# Patient Record
Sex: MALE | Race: BLACK OR AFRICAN AMERICAN | Employment: UNEMPLOYED | URBAN - METROPOLITAN AREA
[De-identification: names, ages, dates, MRNs, and addresses within clinical notes are randomized per-mention and may not be internally consistent; named-entity substitution may affect disease eponyms.]

---

## 2022-02-07 ENCOUNTER — OFFICE VISIT (OUTPATIENT)
Dept: FAMILY MEDICINE CLINIC | Age: 57
End: 2022-02-07
Payer: COMMERCIAL

## 2022-02-07 ENCOUNTER — TELEPHONE (OUTPATIENT)
Dept: FAMILY MEDICINE CLINIC | Age: 57
End: 2022-02-07

## 2022-02-07 VITALS
HEART RATE: 54 BPM | BODY MASS INDEX: 29.89 KG/M2 | TEMPERATURE: 98.5 F | DIASTOLIC BLOOD PRESSURE: 75 MMHG | SYSTOLIC BLOOD PRESSURE: 163 MMHG | HEIGHT: 70 IN | OXYGEN SATURATION: 98 % | WEIGHT: 208.8 LBS | RESPIRATION RATE: 18 BRPM

## 2022-02-07 DIAGNOSIS — Z12.11 COLON CANCER SCREENING: ICD-10-CM

## 2022-02-07 DIAGNOSIS — Z13.29 SCREENING FOR THYROID DISORDER: ICD-10-CM

## 2022-02-07 DIAGNOSIS — Z11.59 ENCOUNTER FOR HEPATITIS C SCREENING TEST FOR LOW RISK PATIENT: ICD-10-CM

## 2022-02-07 DIAGNOSIS — E78.5 HYPERLIPIDEMIA, UNSPECIFIED HYPERLIPIDEMIA TYPE: ICD-10-CM

## 2022-02-07 DIAGNOSIS — M1A.9XX0 CHRONIC GOUT INVOLVING TOE OF LEFT FOOT WITHOUT TOPHUS, UNSPECIFIED CAUSE: ICD-10-CM

## 2022-02-07 DIAGNOSIS — R07.9 INTERMITTENT CHEST PAIN: ICD-10-CM

## 2022-02-07 DIAGNOSIS — Z00.00 ENCOUNTER FOR MEDICAL EXAMINATION TO ESTABLISH CARE: Primary | ICD-10-CM

## 2022-02-07 DIAGNOSIS — Z12.5 SPECIAL SCREENING EXAMINATION FOR NEOPLASM OF PROSTATE: ICD-10-CM

## 2022-02-07 DIAGNOSIS — K21.9 GASTROESOPHAGEAL REFLUX DISEASE WITHOUT ESOPHAGITIS: ICD-10-CM

## 2022-02-07 DIAGNOSIS — G44.89 OTHER HEADACHE SYNDROME: ICD-10-CM

## 2022-02-07 DIAGNOSIS — E55.9 VITAMIN D DEFICIENCY: ICD-10-CM

## 2022-02-07 DIAGNOSIS — I10 PRIMARY HYPERTENSION: ICD-10-CM

## 2022-02-07 DIAGNOSIS — R03.0 ELEVATED BLOOD PRESSURE READING: ICD-10-CM

## 2022-02-07 PROCEDURE — 99204 OFFICE O/P NEW MOD 45 MIN: CPT | Performed by: NURSE PRACTITIONER

## 2022-02-07 PROCEDURE — 93000 ELECTROCARDIOGRAM COMPLETE: CPT | Performed by: NURSE PRACTITIONER

## 2022-02-07 RX ORDER — VALSARTAN 40 MG/1
40 TABLET ORAL DAILY
Qty: 90 TABLET | Refills: 1 | Status: SHIPPED | OUTPATIENT
Start: 2022-02-07 | End: 2022-02-07 | Stop reason: SDUPTHER

## 2022-02-07 RX ORDER — ALLOPURINOL 100 MG/1
100 TABLET ORAL 3 TIMES DAILY
Qty: 90 TABLET | Refills: 1 | Status: SHIPPED | OUTPATIENT
Start: 2022-02-07 | End: 2022-02-07 | Stop reason: SDUPTHER

## 2022-02-07 RX ORDER — ASPIRIN 81 MG/1
81 TABLET ORAL DAILY
Qty: 90 TABLET | Refills: 0
Start: 2022-02-07

## 2022-02-07 RX ORDER — VALSARTAN 40 MG/1
40 TABLET ORAL DAILY
Qty: 90 TABLET | Refills: 1 | Status: SHIPPED | OUTPATIENT
Start: 2022-02-07

## 2022-02-07 RX ORDER — ALLOPURINOL 100 MG/1
100 TABLET ORAL 3 TIMES DAILY
Qty: 90 TABLET | Refills: 1 | Status: SHIPPED | OUTPATIENT
Start: 2022-02-07 | End: 2022-04-11

## 2022-02-07 NOTE — TELEPHONE ENCOUNTER
Pt had an appt today in office; Pt is calling back per Soraya Cantu ; Pt's states that medication is very expensive; please call pt back       Thank You

## 2022-02-07 NOTE — PROGRESS NOTES
Chief Complaint   Patient presents with    New Patient    Headache     Pt states he has been having headaches off and on for years. Pt states he gets sharp pain and pressure in area around left big toe and both knees. 1. Have you been to the ER, urgent care clinic since your last visit? Hospitalized since your last visit? No    2. Have you seen or consulted any other health care providers outside of the [address] [place] [address] since your last visit? Include any pap smears or colon screening.  No    Visit Vitals  BP (!) 163/75 (BP 1 Location: Left arm, BP Patient Position: Sitting)   Pulse (!) 54   Temp 98.5 °F (36.9 °C) (Oral)   Resp 18   Ht 5' 9.5\" (1.765 m)   Wt 208 lb 12.8 oz (94.7 kg)   SpO2 98%   BMI 30.39 kg/m²

## 2022-02-07 NOTE — PROGRESS NOTES
Chief Complaint   Patient presents with    New Patient    Headache           HPI:  Ish Billingsley is a 64y.o. year old male who is a new patient and is here to establish care. Here with wife. [place] [facility] [place] intermittently for years but over last couple weeks they have been better. Sometimes last 30 min- 3/4 hours  Laying down in dark room makes better  tried OTC migraine medicine but does not help  Does wear glasses. Bilateral knee pain and left great toe pain for the past 2-3 months. No injury or trauma. Pain is present all the time  Tried compression socks and orthopedic insoles in shoes  Has tried OTC foot cream without relief. Sometimes NSAID helps. Shrimp makes pain worse  Sometimes toe gets red and swollen intermittently  No history of gout  Drinks plenty of water. BP elevated because of white coat syndrome  Has intermittent chest pain mid/left chest, stabbing pain that radiates into his back. Lasts couple minutes. Sitting and holding pressure to his chest makes the pain better. When he has the pain, he has SOB and dizziness but those symptoms resolve after couple seconds. These episodes happen 1-2 times per day and have been ongoing for a year. Says that his HR has always been low, workout every day. Says 4 years ago or so he had a cardiac referral but was unable  to get get insurance to cover. Does admit that he has some GERD symptoms on and off too. Says that he takes OTC PPI intermittently and sometimes that does help with his chest pain. Says that he feels acid symptoms and burping occasionally. Does not take PPI daily     Social Etoh only  +marijuana   No cigarettes       The following sections were reviewed & updated as appropriate: PMH, PL, PSH, and SH.       Health Maintenance:     Past Medical History:   Diagnosis Date    Headache      Past Surgical History:   Procedure Laterality Date    HX ROTATOR CUFF REPAIR Left 2011  HX ROTATOR CUFF REPAIR Right 2012           Prior to Admission medications    Not on File          No Known Allergies         ROS:  Gen: no fatigue, fever, chills  Eyes: no excessive tearing, itching, or discharge  Nose: no rhinorrhea, no sinus pain  Mouth: no oral lesions, no sore throat  Resp: no shortness of breath, no wheezing, no cough  CV: no chest pain, no paroxysmal nocturnal dyspnea  Abd: no nausea, no heartburn, no diarrhea, no constipation, no abdominal pain  Neuro: no headaches, no syncope or presyncopal episodes  Endo: no polyuria, no polydipsia  Heme: no lymphadenopathy, no easy bruising or bleeding      Objective:    Visit Vitals  BP (!) 163/75 (BP 1 Location: Left arm, BP Patient Position: Sitting)   Pulse (!) 54   Temp 98.5 °F (36.9 °C) (Oral)   Resp 18   Ht 5' 9.5\" (1.765 m)   Wt 208 lb 12.8 oz (94.7 kg)   SpO2 98%   BMI 30.39 kg/m²     Gen: alert, oriented, no acute distress  Head: normocephalic, atraumatic  Ears: external auditory canals clear, TMs without erythema or effusion  Eyes: pupils equal round reactive to light, sclera clear, conjunctiva clear  Nose: normal turbinates, no rhinorrhea  Oral: moist mucus membranes, no oral lesions, no pharyngeal inflammation or exudate  Neck: supple, no lymphadenopathy  Resp: no increased work of breathing, lungs clear to ausculation bilaterally  CV: S1, S2 normal, no murmurs, rubs, or gallops. Abd: soft, not tender, not distended. No hepatosplenomegaly. Normal bowel sounds. No hernias. Neuro: cranial nerves intact, normal strength and movement in all extremities, and sensation intact and symmetric. Skin: no lesion or rash  Left MTP:  No redness but slightly swollen and tender to touch. Assessment & Plan:  Differential diagnosis and treatment options reviewed with patient who is in agreement with treatment plan as outlined below. ICD-10-CM ICD-9-CM    1. Encounter for medical examination to establish care  Z00.00 V70.9    2. Intermittent chest pain  R07.9 786.50 AMB POC EKG ROUTINE W/ 12 LEADS, INTER & REP      CBC WITH AUTOMATED DIFF      METABOLIC PANEL, COMPREHENSIVE      REFERRAL TO CARDIOLOGY      valsartan (DIOVAN) 40 mg tablet      METABOLIC PANEL, COMPREHENSIVE      CBC WITH AUTOMATED DIFF   3. Other headache syndrome  G44.89 339.89    4. Vitamin D deficiency  E55.9 268.9 VITAMIN D, 25 HYDROXY      VITAMIN D, 25 HYDROXY   5. Screening for thyroid disorder  Z13.29 V77.0 TSH 3RD GENERATION      TSH 3RD GENERATION   6. Elevated blood pressure reading  C16.7 294.5 METABOLIC PANEL, COMPREHENSIVE      METABOLIC PANEL, COMPREHENSIVE   7. Encounter for hepatitis C screening test for low risk patient  Z11.59 V73.89 HEPATITIS C AB      HEPATITIS C AB   8. Special screening examination for neoplasm of prostate  Z12.5 V76.44 PSA, DIAGNOSTIC (PROSTATE SPECIFIC AG)      PSA, DIAGNOSTIC (PROSTATE SPECIFIC AG)   9. Hyperlipidemia, unspecified hyperlipidemia type  W18.7 275.3 METABOLIC PANEL, COMPREHENSIVE      LIPID PANEL      LIPID PANEL      METABOLIC PANEL, COMPREHENSIVE   10. Colon cancer screening  Z12.11 V76.51 REFERRAL TO GASTROENTEROLOGY   11. 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 9XX0 274.02 URIC ACID      allopurinoL (ZYLOPRIM) 100 mg tablet      URIC ACID   12. Primary hypertension  I10 401.9 REFERRAL TO CARDIOLOGY      valsartan (DIOVAN) 40 mg tablet   13. Gastroesophageal reflux disease without esophagitis  K21.9 530.81        Routine labs today  BP elevated. NSB on EKG, ?left atrial enlargement likely secondary to non treated HTN  Will start on valsartan daily. DASH diet discussed. Return in two weeks for BP recheck. Will refer to cardiology for further evaluation     Start daily baby ASA   Start daily OTC PPI to treat possible GERD symptoms inducing intermittent CP    Refer to GI for routine colonoscopy    Gout:  Not acute but having daily pain in MTP.   Will start on allopurinol 100mg TID for now, pending labs. If pain resolves, may decrease to once per day. Will let me know in couple weeks. Purine restricted diet encouraged and copy given in AVS    Discussed BMI and healthy weight. Encouraged patient to work to implement changes including diet high in raw fruits and vegetables, lean protein and good fats. Limit refined, processed carbohydrates and sugar. Encouraged regular exercise. Verbal and written instructions (see AVS) provided. Patient expresses understanding and agreement of diagnosis and treatment plan.

## 2022-02-07 NOTE — PATIENT INSTRUCTIONS
Gout: Care Instructions  Overview     Gout is a form of arthritis caused by a buildup of uric acid crystals in a joint. It causes sudden attacks of pain, swelling, redness, and stiffness, usually in one joint, especially the big toe. Gout usually comes on without a cause. But it can be brought on by drinking alcohol (especially beer), eating or drinking things made with high-fructose corn syrup, or eating seafood or red meat. Taking certain medicines, such as diuretics, can also trigger an attack of gout. Taking your medicines as prescribed and following up with your doctor regularly can help you avoid gout attacks in the future. Follow-up care is a key part of your treatment and safety. Be sure to make and go to all appointments, and call your doctor if you are having problems. It's also a good idea to know your test results and keep a list of the medicines you take. How can you care for yourself at home? · If the joint is swollen, put ice or a cold pack on the area for 10 to 20 minutes at a time. Put a thin cloth between the ice and your skin. · Prop up the sore limb on a pillow when you ice it or anytime you sit or lie down during the next 3 days. Try to keep it above the level of your heart. This can help reduce swelling. · Rest sore joints. Avoid activities that put weight or strain on the joints for a few days. Take short rest breaks from your regular activities during the day. · Take your medicines exactly as prescribed. Call your doctor if you think you are having a problem with your medicine. · Take pain medicines exactly as directed. ? If the doctor gave you a prescription medicine for pain, take it as prescribed. ? If you are not taking a prescription pain medicine, ask your doctor if you can take an over-the-counter medicine. · Eat less seafood and red meat. · Avoid foods or drinks that are made with high-fructose corn syrup. · Check with your doctor before drinking alcohol.   · Losing weight, if you are overweight, may help reduce attacks of gout. But do not go on a diet that causes rapid weight loss. Losing a lot of weight in a short amount of time can cause a gout attack. When should you call for help? Call your doctor now or seek immediate medical care if:    · You have a fever.     · The joint is so painful you cannot use it.     · You have sudden, unexplained swelling, redness, warmth, or severe pain in one or more joints. Watch closely for changes in your health, and be sure to contact your doctor if:    · You have joint pain.     · Your symptoms get worse or are not improving after 2 or 3 days. Where can you learn more? Go to http://www.gray.com/  Enter E531 in the search box to learn more about \"Gout: Care Instructions. \"  Current as of: April 30, 2021               Content Version: 13.0  © 1862-0056 [employer]. Care instructions adapted under license by [employer] (which disclaims liability or warranty for this information). If you have questions about a medical condition or this instruction, always ask your healthcare professional. [address] any warranty or liability for your use of this information. DASH Diet: Care Instructions  Your Care Instructions     The DASH diet is an eating plan that can help lower your blood pressure. DASH stands for Dietary Approaches to Stop Hypertension. Hypertension is high blood pressure. The DASH diet focuses on eating foods that are high in calcium, potassium, and magnesium. These nutrients can lower blood pressure. The foods that are highest in these nutrients are fruits, vegetables, low-fat dairy products, nuts, seeds, and legumes. But taking calcium, potassium, and magnesium supplements instead of eating foods that are high in those nutrients does not have the same effect. The DASH diet also includes whole grains, fish, and poultry.   The DASH diet is one of several lifestyle changes your doctor may recommend to lower your high blood pressure. Your doctor may also want you to decrease the amount of sodium in your diet. Lowering sodium while following the DASH diet can lower blood pressure even further than just the DASH diet alone. Follow-up care is a key part of your treatment and safety. Be sure to make and go to all appointments, and call your doctor if you are having problems. It's also a good idea to know your test results and keep a list of the medicines you take. How can you care for yourself at home? Following the DASH diet  · Eat 4 to 5 servings of fruit each day. A serving is 1 medium-sized piece of fruit, ½ cup chopped or canned fruit, 1/4 cup dried fruit, or 4 ounces (½ cup) of fruit juice. Choose fruit more often than fruit juice. · Eat 4 to 5 servings of vegetables each day. A serving is 1 cup of lettuce or raw leafy vegetables, ½ cup of chopped or cooked vegetables, or 4 ounces (½ cup) of vegetable juice. Choose vegetables more often than vegetable juice. · Get 2 to 3 servings of low-fat and fat-free dairy each day. A serving is 8 ounces of milk, 1 cup of yogurt, or 1 ½ ounces of cheese. · Eat 6 to 8 servings of grains each day. A serving is 1 slice of bread, 1 ounce of dry cereal, or ½ cup of cooked rice, pasta, or cooked cereal. Try to choose whole-grain products as much as possible. · Limit lean meat, poultry, and fish to 2 servings each day. A serving is 3 ounces, about the size of a deck of cards. · Eat 4 to 5 servings of nuts, seeds, and legumes (cooked dried beans, lentils, and split peas) each week. A serving is 1/3 cup of nuts, 2 tablespoons of seeds, or ½ cup of cooked beans or peas. · Limit fats and oils to 2 to 3 servings each day. A serving is 1 teaspoon of vegetable oil or 2 tablespoons of salad dressing. · Limit sweets and added sugars to 5 servings or less a week.  A serving is 1 tablespoon jelly or jam, ½ cup sorbet, or 1 cup of lemonade. · Eat less than 2,300 milligrams (mg) of sodium a day. If you limit your sodium to 1,500 mg a day, you can lower your blood pressure even more. · Be aware that all of these are the suggested number of servings for people who eat 1,800 to 2,000 calories a day. Your recommended number of servings may be different if you need more or fewer calories. Tips for success  · Start small. Do not try to make dramatic changes to your diet all at once. You might feel that you are missing out on your favorite foods and then be more likely to not follow the plan. Make small changes, and stick with them. Once those changes become habit, add a few more changes. · Try some of the following:  ? Make it a goal to eat a fruit or vegetable at every meal and at snacks. This will make it easy to get the recommended amount of fruits and vegetables each day. ? Try yogurt topped with fruit and nuts for a snack or healthy dessert. ? Add lettuce, tomato, cucumber, and onion to sandwiches. ? Combine a ready-made pizza crust with low-fat mozzarella cheese and lots of vegetable toppings. Try using tomatoes, squash, spinach, broccoli, carrots, cauliflower, and onions. ? Have a variety of cut-up vegetables with a low-fat dip as an appetizer instead of chips and dip. ? Sprinkle sunflower seeds or chopped almonds over salads. Or try adding chopped walnuts or almonds to cooked vegetables. ? Try some vegetarian meals using beans and peas. Add garbanzo or kidney beans to salads. Make burritos and tacos with mashed bocanegra beans or black beans. Where can you learn more? Go to http://www.WhoSay.com/  Enter H967 in the search box to learn more about \"DASH Diet: Care Instructions. \"  Current as of: April 29, 2021               Content Version: 13.0  © 1809-0432 [employer]. Care instructions adapted under license by [employer] (which disclaims liability or warranty for this information).  If you have questions about a medical condition or this instruction, always ask your healthcare professional. [place] [address] any warranty or liability for your use of this information.

## 2022-02-07 NOTE — TELEPHONE ENCOUNTER
----- Message from Trina Dennis sent at 2/7/2022 12:35 PM EST -----  Subject: Message to Provider    QUESTIONS  Information for Provider? Patient was calling back to speak with Gracie Marte,   patient would like a call back. ---------------------------------------------------------------------------  --------------  Kadi PUGA  What is the best way for the office to contact you? OK to leave message on   voicemail  Preferred Call Back Phone Number? [phone]  ---------------------------------------------------------------------------  --------------  SCRIPT ANSWERS  Relationship to Patient?  Self

## 2022-02-07 NOTE — TELEPHONE ENCOUNTER
Pt states [place] didn't accept his insurance.  He would like for rx's to go to the [facility] in [facility]

## 2022-02-08 LAB
25(OH)D3 SERPL-MCNC: 11.3 NG/ML (ref 30–100)
ALBUMIN SERPL-MCNC: 4.7 G/DL (ref 3.5–5)
ALBUMIN/GLOB SERPL: 1.3 {RATIO} (ref 1.1–2.2)
ALP SERPL-CCNC: 84 U/L (ref 45–117)
ALT SERPL-CCNC: 20 U/L (ref 12–78)
ANION GAP SERPL CALC-SCNC: 4 MMOL/L (ref 5–15)
AST SERPL-CCNC: 20 U/L (ref 15–37)
BASOPHILS # BLD: 0.1 K/UL (ref 0–0.1)
BASOPHILS NFR BLD: 1 % (ref 0–1)
BILIRUB SERPL-MCNC: 0.4 MG/DL (ref 0.2–1)
BUN SERPL-MCNC: 9 MG/DL (ref 6–20)
BUN/CREAT SERPL: 7 (ref 12–20)
CALCIUM SERPL-MCNC: 9.6 MG/DL (ref 8.5–10.1)
CHLORIDE SERPL-SCNC: 107 MMOL/L (ref 97–108)
CHOLEST SERPL-MCNC: 218 MG/DL
CO2 SERPL-SCNC: 29 MMOL/L (ref 21–32)
CREAT SERPL-MCNC: 1.23 MG/DL (ref 0.7–1.3)
DIFFERENTIAL METHOD BLD: NORMAL
EOSINOPHIL # BLD: 0.1 K/UL (ref 0–0.4)
EOSINOPHIL NFR BLD: 2 % (ref 0–7)
ERYTHROCYTE [DISTWIDTH] IN BLOOD BY AUTOMATED COUNT: 14.5 % (ref 11.5–14.5)
GLOBULIN SER CALC-MCNC: 3.5 G/DL (ref 2–4)
GLUCOSE SERPL-MCNC: 104 MG/DL (ref 65–100)
HCT VFR BLD AUTO: 46.6 % (ref 36.6–50.3)
HCV AB SERPL QL IA: NONREACTIVE
HDLC SERPL-MCNC: 55 MG/DL
HDLC SERPL: 4 {RATIO} (ref 0–5)
HGB BLD-MCNC: 14.9 G/DL (ref 12.1–17)
IMM GRANULOCYTES # BLD AUTO: 0 K/UL (ref 0–0.04)
IMM GRANULOCYTES NFR BLD AUTO: 0 % (ref 0–0.5)
LDLC SERPL CALC-MCNC: 137.4 MG/DL (ref 0–100)
LYMPHOCYTES # BLD: 2.5 K/UL (ref 0.8–3.5)
LYMPHOCYTES NFR BLD: 36 % (ref 12–49)
MCH RBC QN AUTO: 31 PG (ref 26–34)
MCHC RBC AUTO-ENTMCNC: 32 G/DL (ref 30–36.5)
MCV RBC AUTO: 97.1 FL (ref 80–99)
MONOCYTES # BLD: 0.6 K/UL (ref 0–1)
MONOCYTES NFR BLD: 8 % (ref 5–13)
NEUTS SEG # BLD: 3.7 K/UL (ref 1.8–8)
NEUTS SEG NFR BLD: 53 % (ref 32–75)
NRBC # BLD: 0 K/UL (ref 0–0.01)
NRBC BLD-RTO: 0 PER 100 WBC
PLATELET # BLD AUTO: 220 K/UL (ref 150–400)
PMV BLD AUTO: 12.6 FL (ref 8.9–12.9)
POTASSIUM SERPL-SCNC: 4.4 MMOL/L (ref 3.5–5.1)
PROT SERPL-MCNC: 8.2 G/DL (ref 6.4–8.2)
PSA SERPL-MCNC: 2.3 NG/ML (ref 0.01–4)
RBC # BLD AUTO: 4.8 M/UL (ref 4.1–5.7)
SODIUM SERPL-SCNC: 140 MMOL/L (ref 136–145)
TRIGL SERPL-MCNC: 128 MG/DL (ref ?–150)
TSH SERPL DL<=0.05 MIU/L-ACNC: 0.93 UIU/ML (ref 0.36–3.74)
URATE SERPL-MCNC: 5.2 MG/DL (ref 3.5–7.2)
VLDLC SERPL CALC-MCNC: 25.6 MG/DL
WBC # BLD AUTO: 7 K/UL (ref 4.1–11.1)

## 2022-02-09 ENCOUNTER — TELEPHONE (OUTPATIENT)
Dept: FAMILY MEDICINE CLINIC | Age: 57
End: 2022-02-09

## 2022-02-09 DIAGNOSIS — Z12.11 COLON CANCER SCREENING: Primary | ICD-10-CM

## 2022-02-09 NOTE — TELEPHONE ENCOUNTER
Pt states that he called to make an appt with the gastro (Dr. Kayy Ayoub) that he was referred to; Pt states that the office did not take his insurance (I'm assuming because he said \"card\"); Pt has another preferred [facility] that is par with his insurance;  Pt states that [facility]; Pt needs a new referral for a gastro      Thank You

## 2022-02-09 NOTE — TELEPHONE ENCOUNTER
Pt states he needs a new GI referral with his insurance he can only go to [facility] he doesn't has a provider name

## 2022-02-10 ENCOUNTER — TELEPHONE (OUTPATIENT)
Dept: FAMILY MEDICINE CLINIC | Age: 57
End: 2022-02-10

## 2022-02-10 RX ORDER — ERGOCALCIFEROL 1.25 MG/1
50000 CAPSULE ORAL
Qty: 12 CAPSULE | Refills: 0 | Status: SHIPPED | OUTPATIENT
Start: 2022-02-10 | End: 2022-04-11 | Stop reason: SDUPTHER

## 2022-02-10 NOTE — TELEPHONE ENCOUNTER
Mr. Sheridan Garcia called and stated that Dr. Verona Lora office will not schedule appt for his colonoscopy until referral is received. I informed him that the referral was done yesterday and he has requested that it be faxed to [phone], Attn:  Jacoby Levy. Thanks.

## 2022-02-10 NOTE — TELEPHONE ENCOUNTER
Referral and demographics faxed to # provided addressed to [facility] and confirmation page recv'd.

## 2022-02-10 NOTE — PROGRESS NOTES
Labs are back. Please let patient know:      Cholesterol levels are a little elevated. Work on diet and exercise, low fat diet and increase fiber. His  vitamin d is pretty low. He will need to be on a prescription high dose supplement that he will take once per week for 12 weeks. Vitamin D deficiency is very common and to replace the amount of vitamin D you need to be therapeutic, you will need a supplement (you will not be able to eat enough vitamin d to achieve this level). The symptoms of vitamin D deficiency are sometimes vague and can include tiredness and general aches and pains. Some people may not have any symptoms at all. Vitamin D also fights infections, including colds and the flu, as it regulates the expression of genes that influence your immune system to attack and destroy bacteria and viruses. When he completes high dose prescription after 12 weeks he will need to start taking a daily over the counter Vitamin d3 4000u per day.    Thanks  Daisy Claudio NP

## 2022-02-21 ENCOUNTER — CLINICAL SUPPORT (OUTPATIENT)
Dept: FAMILY MEDICINE CLINIC | Age: 57
End: 2022-02-21

## 2022-02-21 VITALS
RESPIRATION RATE: 16 BRPM | OXYGEN SATURATION: 98 % | WEIGHT: 208 LBS | HEART RATE: 55 BPM | TEMPERATURE: 98 F | DIASTOLIC BLOOD PRESSURE: 80 MMHG | HEIGHT: 70 IN | BODY MASS INDEX: 29.78 KG/M2 | SYSTOLIC BLOOD PRESSURE: 142 MMHG

## 2022-02-21 DIAGNOSIS — I10 ESSENTIAL HYPERTENSION: Primary | ICD-10-CM

## 2022-02-21 NOTE — PROGRESS NOTES
Per Cooper, informed pt to watch sodium, if BP at home are 140 and above to contact office, but if pt feeling well continue medication and return in 3 months, pt voiced understanding.

## 2022-02-21 NOTE — PROGRESS NOTES
Chief Complaint   Patient presents with    Blood Pressure Check     medication f/u     1. Have you been to the ER, urgent care clinic since your last visit? Hospitalized since your last visit? No    2. Have you seen or consulted any other health care providers outside of the [address] [place] [address] since your last visit? Include any pap smears or colon screening.  No

## 2022-03-01 ENCOUNTER — OFFICE VISIT (OUTPATIENT)
Dept: CARDIOLOGY CLINIC | Age: 57
End: 2022-03-01
Payer: COMMERCIAL

## 2022-03-01 VITALS
WEIGHT: 212.3 LBS | DIASTOLIC BLOOD PRESSURE: 68 MMHG | SYSTOLIC BLOOD PRESSURE: 126 MMHG | HEART RATE: 51 BPM | RESPIRATION RATE: 18 BRPM | BODY MASS INDEX: 30.39 KG/M2 | HEIGHT: 70 IN | OXYGEN SATURATION: 96 %

## 2022-03-01 DIAGNOSIS — R07.9 CHEST PAIN, UNSPECIFIED TYPE: Primary | ICD-10-CM

## 2022-03-01 PROCEDURE — 93000 ELECTROCARDIOGRAM COMPLETE: CPT | Performed by: INTERNAL MEDICINE

## 2022-03-01 PROCEDURE — 99204 OFFICE O/P NEW MOD 45 MIN: CPT | Performed by: INTERNAL MEDICINE

## 2022-03-01 NOTE — LETTER
3/1/2022    Patient: Gwyn Corley   YOB: 1965   Date of Visit: 3/1/2022     Ilene Corley NP  [address]  [address] [address]  [facility] [facility] [address]    Dear Ilene Corley NP,      Thank you for referring Mr. Med Sherman to [address] [place] [address] for evaluation. My notes for this consultation are attached. If you have questions, please do not hesitate to call me. I look forward to following your patient along with you.       Sincerely,    Carlos Miranda MD

## 2022-03-01 NOTE — PROGRESS NOTES
[address], [place], [address] [facility]  [phone]     Subjective:      Carolina Owen is a 64 y.o. male is here for episodic SSCP accompanied by SOB. No radiation. No clear relation to exertion. Usually 10 min.  + HTN and hyperchol. There are no problems to display for this patient. Reji Hill NP  Past Medical History:   Diagnosis Date    Essential hypertension     Headache     Seizures ([address]       Past Surgical History:   Procedure Laterality Date    HX ROTATOR CUFF REPAIR Left     HX ROTATOR CUFF REPAIR Right      No Known Allergies   Family History   Problem Relation Age of Onset    No Known Problems Mother     No Known Problems Father       Social History     Socioeconomic History    Marital status:      Spouse name: Not on file    Number of children: Not on file    Years of education: Not on file    Highest education level: Not on file   Occupational History    Not on file   Tobacco Use    Smoking status: Former Smoker     Packs/day: 2.00     Years: 20.00     Pack years: 40.00     Quit date:      Years since quittin.1    Smokeless tobacco: Never Used   Vaping Use    Vaping Use: Never used   Substance and Sexual Activity    Alcohol use: Yes     Alcohol/week: 2.0 standard drinks     Types: 1 Cans of beer, 1 Shots of liquor per week     Comment: occasionally    Drug use: Yes     Types: Marijuana    Sexual activity: Not on file   Other Topics Concern    Not on file   Social History Narrative    Not on file     Social Determinants of Health     Financial Resource Strain:     Difficulty of Paying Living Expenses: Not on file   Food Insecurity:     Worried About Running Out of Food in the Last Year: Not on file    Niki of Food in the Last Year: Not on file   Transportation Needs:     Lack of Transportation (Medical): Not on file    Lack of Transportation (Non-Medical):  Not on file   Physical Activity:     Days of Exercise per Week: Not on file    Minutes of Exercise per Session: Not on file   Stress:     Feeling of Stress : Not on file   Social Connections:     Frequency of Communication with Friends and Family: Not on file    Frequency of Social Gatherings with Friends and Family: Not on file    Attends Orthodox Services: Not on file    Active Member of [address] [place] [address] or Organizations: Not on file    Attends Club or Organization Meetings: Not on file    Marital Status: Not on file   Intimate Partner Violence:     Fear of Current or Ex-Partner: Not on file    Emotionally Abused: Not on file    Physically Abused: Not on file    Sexually Abused: Not on file   Housing Stability:     Unable to Pay for Housing in the Last Year: Not on file    Number of [place] in the Last Year: Not on file    Unstable Housing in the Last Year: Not on file      Current Outpatient Medications   Medication Sig    ergocalciferol (Vitamin D2) 1,250 mcg (50,000 unit) capsule Take 1 Capsule by mouth every seven (7) days. Indications: low vitamin D levels    aspirin delayed-release 81 mg tablet Take 1 Tablet by mouth daily.  allopurinoL (ZYLOPRIM) 100 mg tablet Take 1 Tablet by mouth three (3) times daily.  valsartan (DIOVAN) 40 mg tablet Take 1 Tablet by mouth daily. No current facility-administered medications for this visit. Review of Symptoms:  11 systems reviewed, negative other than as stated in the HPI    Physical ExamPhysical Exam:    Vitals:    03/01/22 1412   BP: 126/68   Pulse: (!) 51   Resp: 18   SpO2: 96%   Weight: 212 lb 4.8 oz (96.3 kg)   Height: 5' 9.5\" (1.765 m)     Body mass index is 30.9 kg/m². General PE  Gen:  NAD  Mental Status - Alert. General Appearance - Not in acute distress. HEENT:  PERRL, no carotid bruits or JVD  Chest and Lung Exam   Inspection: Accessory muscles - No use of accessory muscles in breathing.    Auscultation:   Breath sounds: - Normal.   Cardiovascular   Inspection: Jugular vein - Bilateral - Inspection Normal.   Palpation/Percussion:   Apical Impulse: - Normal.   Auscultation: Rhythm - Regular. Heart Sounds - S1 WNL and S2 WNL. No S3 or S4. Murmurs & Other Heart Sounds: Auscultation of the heart reveals - No Murmurs. Peripheral Vascular   Upper Extremity: Inspection - Bilateral - No Cyanotic nailbeds or Digital clubbing. Lower Extremity:   Palpation: Edema - Bilateral - No edema. Abdomen:   Soft, non-tender, bowel sounds are active. Neuro: A&O times 3, CN and motor grossly WNL    Labs:   Lab Results   Component Value Date/Time    Cholesterol, total 218 (H) 02/07/2022 10:37 AM    HDL Cholesterol 55 02/07/2022 10:37 AM    LDL, calculated 137.4 (H) 02/07/2022 10:37 AM    Triglyceride 128 02/07/2022 10:37 AM    CHOL/HDL Ratio 4.0 02/07/2022 10:37 AM     No results found for: CPK, CPKX, CPX  Lab Results   Component Value Date/Time    Sodium 140 02/07/2022 10:37 AM    Potassium 4.4 02/07/2022 10:37 AM    Chloride 107 02/07/2022 10:37 AM    CO2 29 02/07/2022 10:37 AM    Anion gap 4 (L) 02/07/2022 10:37 AM    Glucose 104 (H) 02/07/2022 10:37 AM    BUN 9 02/07/2022 10:37 AM    Creatinine 1.23 02/07/2022 10:37 AM    BUN/Creatinine ratio 7 (L) 02/07/2022 10:37 AM    GFR est AA >60 02/07/2022 10:37 AM    GFR est non-AA >60 02/07/2022 10:37 AM    Calcium 9.6 02/07/2022 10:37 AM    Bilirubin, total 0.4 02/07/2022 10:37 AM    Alk. phosphatase 84 02/07/2022 10:37 AM    Protein, total 8.2 02/07/2022 10:37 AM    Albumin 4.7 02/07/2022 10:37 AM    Globulin 3.5 02/07/2022 10:37 AM    A-G Ratio 1.3 02/07/2022 10:37 AM    ALT (SGPT) 20 02/07/2022 10:37 AM       EKG:  NSR normal     Assessment:     Assessment:        ICD-10-CM ICD-9-CM    1.  Chest pain, unspecified type  R07.9 786.50 AMB POC EKG ROUTINE W/ 12 LEADS, INTER & REP       Orders Placed This Encounter    AMB POC EKG ROUTINE W/ 12 LEADS, INTER & REP     Order Specific Question:   Reason for Exam:     Answer:   routine        Plan:     SSCP with dyspnea and mild diaphoresis; will evaluate with stress echo.     Kaia Patton MD

## 2022-03-01 NOTE — PROGRESS NOTES
1. Have you been to the ER, urgent care clinic since your last visit? Hospitalized since your last visit? No     2. Have you seen or consulted any other health care providers outside of the [address] [place] [address] since your last visit? Include any pap smears or colon screening. No     Chief Complaint   Patient presents with    Chest Pain     NP, ref by Izabel Maldonado NP.  C/O CP rest and exertion.

## 2022-04-04 ENCOUNTER — TELEPHONE (OUTPATIENT)
Dept: CARDIOLOGY CLINIC | Age: 57
End: 2022-04-04

## 2022-04-04 ENCOUNTER — ANCILLARY PROCEDURE (OUTPATIENT)
Dept: CARDIOLOGY CLINIC | Age: 57
End: 2022-04-04
Payer: COMMERCIAL

## 2022-04-04 VITALS
SYSTOLIC BLOOD PRESSURE: 126 MMHG | WEIGHT: 212 LBS | HEIGHT: 69 IN | BODY MASS INDEX: 31.4 KG/M2 | DIASTOLIC BLOOD PRESSURE: 68 MMHG

## 2022-04-04 DIAGNOSIS — R07.9 CHEST PAIN, UNSPECIFIED TYPE: ICD-10-CM

## 2022-04-04 LAB
STRESS ANGINA INDEX: 0
STRESS BASELINE DIAS BP: 80 MMHG
STRESS BASELINE HR: 58 BPM
STRESS BASELINE ST DEPRESSION: 0 MM
STRESS BASELINE SYS BP: 160 MMHG
STRESS O2 SAT PEAK: 95 %
STRESS O2 SAT REST: 96 %
STRESS PEAK DIAS BP: 90 MMHG
STRESS PEAK SYS BP: 210 MMHG
STRESS PERCENT HR ACHIEVED: 101 %
STRESS POST PEAK HR: 166 BPM
STRESS RATE PRESSURE PRODUCT: NORMAL BPM*MMHG
STRESS ST DEPRESSION: 0 MM
STRESS STAGE 1 BP: NORMAL MMHG
STRESS STAGE 1 DURATION: NORMAL MIN:SEC
STRESS STAGE 1 HR: 110 BPM
STRESS STAGE 2 BP: NORMAL MMHG
STRESS STAGE 2 DURATION: NORMAL MIN:SEC
STRESS STAGE 2 HR: 133 BPM
STRESS STAGE 3 BP: NORMAL MMHG
STRESS STAGE 3 DURATION: NORMAL MIN:SEC
STRESS STAGE 3 HR: 155 BPM
STRESS STAGE 4 DURATION: NORMAL MIN:SEC
STRESS STAGE 4 HR: 166 BPM
STRESS STAGE RECOVERY 1 BP: NORMAL MMHG
STRESS STAGE RECOVERY 1 DURATION: NORMAL MIN:SEC
STRESS STAGE RECOVERY 1 HR: 99 BPM
STRESS TARGET HR: 164 BPM

## 2022-04-04 PROCEDURE — 93351 STRESS TTE COMPLETE: CPT | Performed by: INTERNAL MEDICINE

## 2022-04-04 NOTE — TELEPHONE ENCOUNTER
----- Message from Nicole Anderson NP sent at 4/4/2022  1:49 PM EDT -----  Please call the patient and inform that stress test is normal.  Low concern for significant blockages in the heart arteries at this time.     Thanks,  [employer]

## 2022-04-05 NOTE — TELEPHONE ENCOUNTER
Second attempt to call pt in regards to stress test results pt did not answer and mailbox was still full unable to leave a voicemessage.

## 2022-04-09 DIAGNOSIS — M1A.9XX0 CHRONIC GOUT INVOLVING TOE OF LEFT FOOT WITHOUT TOPHUS, UNSPECIFIED CAUSE: ICD-10-CM

## 2022-04-11 DIAGNOSIS — M1A.9XX0 CHRONIC GOUT INVOLVING TOE OF LEFT FOOT WITHOUT TOPHUS, UNSPECIFIED CAUSE: ICD-10-CM

## 2022-04-11 RX ORDER — ALLOPURINOL 100 MG/1
TABLET ORAL
Qty: 90 TABLET | Refills: 0 | Status: SHIPPED | OUTPATIENT
Start: 2022-04-11 | End: 2022-04-11 | Stop reason: SDUPTHER

## 2022-04-11 RX ORDER — ALLOPURINOL 100 MG/1
100 TABLET ORAL 3 TIMES DAILY
Qty: 90 TABLET | Refills: 0 | Status: SHIPPED | OUTPATIENT
Start: 2022-04-11

## 2022-04-11 RX ORDER — ERGOCALCIFEROL 1.25 MG/1
50000 CAPSULE ORAL
Qty: 12 CAPSULE | Refills: 0 | Status: SHIPPED | OUTPATIENT
Start: 2022-04-11

## 2022-04-11 NOTE — TELEPHONE ENCOUNTER
PT is calling to refill the following meds    Requested Prescriptions     Pending Prescriptions Disp Refills    ergocalciferol (Vitamin D2) 1,250 mcg (50,000 unit) capsule 12 Capsule 0     Sig: Take 1 Capsule by mouth every seven (7) days. Indications: low vitamin D levels    allopurinoL (ZYLOPRIM) 100 mg tablet 90 Tablet 0     Sig: Take 1 Tablet by mouth three (3) times daily.      Pt confirmed pharmacy as the [facility] in [address]

## 2022-04-18 NOTE — TELEPHONE ENCOUNTER
Spoke with patient. Verified with two patient identifiers. Informed pt per Ron Evangelista NP, stress test is normal.  Low concern for significant blockages in the heart arteries at this time. Patient verbalized understanding.

## 2022-08-17 ENCOUNTER — HOSPITAL ENCOUNTER (EMERGENCY)
Age: 57
Discharge: HOME | End: 2022-08-18
Attending: FAMILY MEDICINE
Payer: COMMERCIAL

## 2022-08-17 DIAGNOSIS — S93.602A FOOT SPRAIN, LEFT, INITIAL ENCOUNTER: Primary | ICD-10-CM

## 2022-08-17 PROCEDURE — 99283 EMERGENCY DEPT VISIT LOW MDM: CPT

## 2022-08-17 NOTE — Clinical Note
[address] [place] [address] EMERGENCY DEP  [address] [facility] [address] [place] [address]  [phone]    Work/School Note    Date: 8/17/2022    To Whom It May concern:      Rupal Yeh was seen and treated today in the emergency room by the following provider(s):  Attending Provider: Anselmo Motta MD.      Rupal Yeh is excused from work/school on 08/18/22. He is clear to return to work/school on 08/19/22.         Sincerely,          Grupo Nuñez MD

## 2022-08-18 ENCOUNTER — APPOINTMENT (OUTPATIENT)
Dept: GENERAL RADIOLOGY | Age: 57
End: 2022-08-18
Attending: FAMILY MEDICINE
Payer: COMMERCIAL

## 2022-08-18 VITALS
HEART RATE: 74 BPM | OXYGEN SATURATION: 99 % | DIASTOLIC BLOOD PRESSURE: 87 MMHG | SYSTOLIC BLOOD PRESSURE: 152 MMHG | RESPIRATION RATE: 17 BRPM | TEMPERATURE: 98.2 F

## 2022-08-18 PROCEDURE — 73630 X-RAY EXAM OF FOOT: CPT
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
## 2022-08-18 NOTE — DISCHARGE INSTRUCTIONS
--Use the post op shoe as needed. --Ibuprofen 600 mg every 6 hours as needed for pain. Take with food. --Follow up with Dr Severo Sins this week or early next week.

## 2022-12-12 ENCOUNTER — OFFICE VISIT (OUTPATIENT)
Dept: FAMILY MEDICINE CLINIC | Age: 57
End: 2022-12-12
Payer: COMMERCIAL

## 2022-12-12 VITALS
SYSTOLIC BLOOD PRESSURE: 136 MMHG | RESPIRATION RATE: 17 BRPM | OXYGEN SATURATION: 98 % | BODY MASS INDEX: 32.05 KG/M2 | HEART RATE: 60 BPM | DIASTOLIC BLOOD PRESSURE: 77 MMHG | WEIGHT: 216.4 LBS | HEIGHT: 69 IN | TEMPERATURE: 98 F

## 2022-12-12 DIAGNOSIS — M54.50 CHRONIC BILATERAL LOW BACK PAIN WITHOUT SCIATICA: Primary | ICD-10-CM

## 2022-12-12 DIAGNOSIS — R07.89 CHEST WALL PAIN: ICD-10-CM

## 2022-12-12 DIAGNOSIS — G89.29 CHRONIC BILATERAL THORACIC BACK PAIN: ICD-10-CM

## 2022-12-12 DIAGNOSIS — M54.6 CHRONIC BILATERAL THORACIC BACK PAIN: ICD-10-CM

## 2022-12-12 DIAGNOSIS — I10 ESSENTIAL HYPERTENSION: ICD-10-CM

## 2022-12-12 DIAGNOSIS — G89.29 CHRONIC BILATERAL LOW BACK PAIN WITHOUT SCIATICA: Primary | ICD-10-CM

## 2022-12-12 PROCEDURE — 99214 OFFICE O/P EST MOD 30 MIN: CPT | Performed by: FAMILY MEDICINE

## 2022-12-12 PROCEDURE — 3078F DIAST BP <80 MM HG: CPT | Performed by: FAMILY MEDICINE

## 2022-12-12 PROCEDURE — 3074F SYST BP LT 130 MM HG: CPT | Performed by: FAMILY MEDICINE

## 2022-12-12 RX ORDER — TIZANIDINE 4 MG/1
4 TABLET ORAL
Qty: 30 TABLET | Refills: 2 | Status: SHIPPED | OUTPATIENT
Start: 2022-12-12

## 2022-12-12 NOTE — PROGRESS NOTES
Health Maintenance Due   Topic Date Due    DTaP/Tdap/Td series (1 - Tdap) Never done    Shingrix Vaccine Age 50> (1 of 2) Never done    Low dose CT lung screening  Never done    COVID-19 Vaccine (4 - Booster for Moderna series) 02/20/2022    Flu Vaccine (1) Never done     1. \"Have you been to the ER, urgent care clinic since your last visit? Hospitalized since your last visit? \" No    2. \"Have you seen or consulted any other health care providers outside of the [address] [place] [address] since your last visit? \" No     3. For patients aged 39-70: Has the patient had a colonoscopy / FIT/ Cologuard? Yes - Care Gap present. Most recent result on file      If the patient is female:    4. For patients aged 41-77: Has the patient had a mammogram within the past 2 years? NA - based on age or sex      11. For patients aged 21-65: Has the patient had a pap smear?  NA - based on age or sex

## 2022-12-12 NOTE — PROGRESS NOTES
HPI  Addis Arnold is a 62 y.o. male who presents with 2 different back pains. Reports a bilateral low back pain. Points to the quadratus lumborum just above the iliac crest bilaterally when describing this pain. Always present. Nagging pain. More significant pain is in the thorax. Starts as a pain in the area of the sixth rib either to the right or left of the sternum and shoots straight through to his back like a poker. Seems like this is more likely to happen if he is seated or lying down. Has woken him up from sleep. It has happened when he has been up and moving around. In all cases the pain will last for minutes to hours. It will stop him from doing what ever he had been doing and he will need to basically grit his teeth and wait for it to resolve. He has found that pressing his back up against something to massage the area where it hurts in the thoracic back seems to provide some degree of relief. He has not tried any medication for this and does not feel the need for medication for this. Blood pressure is excellent today. He ran out of his blood pressure medicine 3 months ago. He is monitoring his blood pressure at home and his home cuff agrees with ours. He is not sure why his blood pressure is doing better off of medication    PMHx:  Past Medical History:   Diagnosis Date    Essential hypertension     Headache     Seizures (HCC)        Meds:   Current Outpatient Medications   Medication Sig Dispense Refill    tiZANidine (ZANAFLEX) 4 mg tablet Take 1 Tablet by mouth nightly as needed for Muscle Spasm(s). 30 Tablet 2    allopurinoL (ZYLOPRIM) 100 mg tablet Take 1 Tablet by mouth three (3) times daily. 90 Tablet 0    aspirin delayed-release 81 mg tablet Take 1 Tablet by mouth daily.  90 Tablet 0       Allergies:   No Known Allergies    Smoker:  Social History     Tobacco Use   Smoking Status Former    Packs/day: 2.00    Years: 20.00    Pack years: 40.00    Types: Cigarettes    Quit date: 2016    Years since quittin.9   Smokeless Tobacco Never       ETOH:   Social History     Substance and Sexual Activity   Alcohol Use Yes    Alcohol/week: 2.0 standard drinks    Types: 1 Cans of beer, 1 Shots of liquor per week    Comment: occasionally       FH:   Family History   Problem Relation Age of Onset    No Known Problems Mother     No Known Problems Father        ROS:   As listed in HPI. In addition:  Constitutional:   No headache, fever, fatigue, weight loss or weight gain      Cardiac:    No chest pain      Resp:   No cough or shortness of breath      Neuro   No loss of consciousness, dizziness, seizures      Physical Exam:  Blood pressure 136/77, pulse 60, temperature 98 °F (36.7 °C), temperature source Temporal, resp. rate 17, height 5' 9\" (1.753 m), weight 216 lb 6.4 oz (98.2 kg), SpO2 98 %. GEN: No apparent distress. Alert and oriented and responds to all questions appropriately. NEUROLOGIC:  No focal neurologic deficits. Strength and sensation grossly intact. Coordination and gait grossly intact. EXT: Well perfused. No edema. SKIN: No obvious rashes. Lungs clear to auscultation bilaterally  CV regular rate rhythm no murmur  No bony tenderness of the neck thorax or L-spine. No paraspinal tenderness along the entire spine. There is erector spinae and quadratus lumborum tenderness at all levels in the lumbar area. Tenderness amounts to myofascial pain. Right quadratus lumborum seems to be more uncomfortable than left. Chest wall examined. There is a prominent sixth rib bilaterally left worse than right. No obvious deformity on posterior exam.  Spinous processes of the thorax are all lined up. There is no reproducible rib pain in the back.   No myofascial pain in the back       Assessment and Plan     Low back pain  Would be amenable to physical therapy and self-guided exercises  Judicious use of NSAIDs would be fine but he does not think he would do this, likes to avoid medicine  Heating pad, hot shower and topicals i.e. Rossana Merlin would be good options    Thoracic pain  I think it most likely rib type pain. Sixth rib is prominent and pain is generated in the area of the sixth rib. However the intensity of the pain is impressive. I think a chest x-ray would be wise. Looking for anything obvious in the lungs, widened mediastinum etc.  Cardiac and lung pathology is less likely based on the timeline, just is likely to happen at rest as with activity  Refer for physical therapy for this as well  Tizanidine may help with the nocturnal awakening    Hypertension  Blood pressure well controlled off medicine, previously taking valsartan 40 mg, has been on for 3 months. Took it off his list for now. He appears to have an accurate cuff at home. Monitor      ICD-10-CM ICD-9-CM    1. Chronic bilateral low back pain without sciatica  M54.50 724.2 REFERRAL TO PHYSICAL THERAPY    G89.29 338.29 tiZANidine (ZANAFLEX) 4 mg tablet      2. Chronic bilateral thoracic back pain  M54.6 724.1 REFERRAL TO PHYSICAL THERAPY    G89.29 338.29 tiZANidine (ZANAFLEX) 4 mg tablet      3. Chest wall pain  R07.89 786.52 XR CHEST PA LAT      4. Essential hypertension  I10 401.9           AVS given.  Pt expressed understanding of instructions

## 2022-12-14 ENCOUNTER — TELEPHONE (OUTPATIENT)
Dept: FAMILY MEDICINE CLINIC | Age: 57
End: 2022-12-14

## 2022-12-14 NOTE — TELEPHONE ENCOUNTER
Received chest x-ray results from [facility]. These were obtained because chest wall pain. Heart and lungs look fine. There is mild arthritis noted in the spine.   I would expect physical therapy to help as we discussed

## 2022-12-23 ENCOUNTER — TELEPHONE (OUTPATIENT)
Dept: FAMILY MEDICINE CLINIC | Age: 57
End: 2022-12-23

## 2022-12-23 NOTE — TELEPHONE ENCOUNTER
----- Message from Jeanette Listen sent at 12/23/2022 11:27 AM EST -----  Subject: Message to Provider    QUESTIONS  Information for Provider? Dr. Alistair Redman, pt's Heather Fontaine called - Pt   has an authorization from [place] that pt was getting PT at [facility]   PT but that location is out of network. She is calling to let PCP know   that Heather Ferreira is doing an out of network search today for the pt; and if no   findings they will call again to check and see if PCP wants to keep pt at   [facility], even out of network. Minal just wanted to let the office   know; she will call back with any other findings. ---------------------------------------------------------------------------  --------------  Rocky PUGA  [phone]; OK to leave message on voicemail  ---------------------------------------------------------------------------  --------------  SCRIPT ANSWERS  Relationship to Patient? Third Party  Third Party Type? Insurance? Representative Name?  Janett Spencer

## 2023-02-02 ENCOUNTER — OFFICE VISIT (OUTPATIENT)
Dept: FAMILY MEDICINE CLINIC | Age: 58
End: 2023-02-02
Payer: COMMERCIAL

## 2023-02-02 VITALS
SYSTOLIC BLOOD PRESSURE: 160 MMHG | TEMPERATURE: 97.5 F | RESPIRATION RATE: 18 BRPM | BODY MASS INDEX: 32.94 KG/M2 | DIASTOLIC BLOOD PRESSURE: 94 MMHG | OXYGEN SATURATION: 99 % | HEIGHT: 69 IN | WEIGHT: 222.4 LBS | HEART RATE: 82 BPM

## 2023-02-02 DIAGNOSIS — Z13.29 SCREENING FOR THYROID DISORDER: ICD-10-CM

## 2023-02-02 DIAGNOSIS — Z13.220 SCREENING, LIPID: ICD-10-CM

## 2023-02-02 DIAGNOSIS — E55.9 VITAMIN D DEFICIENCY: ICD-10-CM

## 2023-02-02 DIAGNOSIS — R35.0 URINARY FREQUENCY: ICD-10-CM

## 2023-02-02 DIAGNOSIS — R06.83 SNORING: ICD-10-CM

## 2023-02-02 DIAGNOSIS — R30.0 DYSURIA: ICD-10-CM

## 2023-02-02 DIAGNOSIS — R07.9 INTERMITTENT CHEST PAIN: ICD-10-CM

## 2023-02-02 DIAGNOSIS — I10 ESSENTIAL HYPERTENSION: Primary | ICD-10-CM

## 2023-02-02 DIAGNOSIS — Z87.891 PERSONAL HISTORY OF TOBACCO USE, PRESENTING HAZARDS TO HEALTH: ICD-10-CM

## 2023-02-02 DIAGNOSIS — K21.9 GASTROESOPHAGEAL REFLUX DISEASE, UNSPECIFIED WHETHER ESOPHAGITIS PRESENT: ICD-10-CM

## 2023-02-02 DIAGNOSIS — M1A.9XX0 CHRONIC GOUT INVOLVING TOE OF LEFT FOOT WITHOUT TOPHUS, UNSPECIFIED CAUSE: ICD-10-CM

## 2023-02-02 DIAGNOSIS — R73.09 ELEVATED GLUCOSE: ICD-10-CM

## 2023-02-02 LAB
BILIRUB UR QL STRIP: NEGATIVE
GLUCOSE UR-MCNC: NEGATIVE MG/DL
KETONES P FAST UR STRIP-MCNC: NEGATIVE MG/DL
PH UR STRIP: 6 [PH] (ref 4.6–8)
PROT UR QL STRIP: NEGATIVE
SP GR UR STRIP: 1.02 (ref 1–1.03)
UA UROBILINOGEN AMB POC: NORMAL (ref 0.2–1)
URINALYSIS CLARITY POC: CLEAR
URINALYSIS COLOR POC: YELLOW
URINE BLOOD POC: NORMAL
URINE LEUKOCYTES POC: NEGATIVE
URINE NITRITES POC: NEGATIVE

## 2023-02-02 RX ORDER — VALSARTAN 40 MG/1
40 TABLET ORAL DAILY
Qty: 90 TABLET | Refills: 1 | Status: SHIPPED | OUTPATIENT
Start: 2023-02-02

## 2023-02-02 RX ORDER — OMEPRAZOLE 40 MG/1
40 CAPSULE, DELAYED RELEASE ORAL DAILY
Qty: 90 CAPSULE | Refills: 1 | Status: SHIPPED | OUTPATIENT
Start: 2023-02-02

## 2023-02-02 RX ORDER — ALLOPURINOL 100 MG/1
100 TABLET ORAL 2 TIMES DAILY
Qty: 180 TABLET | Refills: 3 | Status: SHIPPED | OUTPATIENT
Start: 2023-02-02

## 2023-02-02 NOTE — PATIENT INSTRUCTIONS

## 2023-02-02 NOTE — PROGRESS NOTES
Chief Complaint   Patient presents with    Follow-up     Routine check     1. Have you been to the ER, urgent care clinic since your last visit? Hospitalized since your last visit? No    2. Have you seen or consulted any other health care providers outside of the [address] [place] [address] since your last visit? Include any pap smears or colon screening.  No    Health Maintenance Due   Topic Date Due    DTaP/Tdap/Td series (1 - Tdap) Never done    Shingles Vaccine (1 of 2) Never done    Low dose CT lung screening  Never done

## 2023-02-02 NOTE — PROGRESS NOTES
Yary Rich is a 62 y.o. male  and presents with recurring, daily headaches that began when he ran out of his BP medicine in 2022. He reports the headaches last about 2-3 hours and are only relieved by going to sleep. He also reports having daily episodes of positional dizziness that last 3-4 minutes long and sometimes result in loss of balance and falling. He denies ever hitting his head. Patient cannot remember the BP medicine he was taking, but his wife reports its was a dose of 100 mg. Patient checks his BP at home and it is consistently 170s/90s. Patient reports having mid-sternal, stabbing chest pain that radiates to his back since February. Reports this occurs 2-4 times per week. He was seen by Dr. Shannon Mares, who prescribed Zanaflex for this same complaint. Patient reports the medicine has not helped, nor has Tylenol. Patient reports having dry mouth and increased frequency in urination, 5-6 times every hour. He says it is painful, but denies any blood or increase in thirst.   Reports B/L hand and feet swelling that comes and goes. Denies excessive etoh use. Denies exercise. He is currently out of his Allopurinol as well.    Chief Complaint   Patient presents with    Follow-up     Routine check       Past Medical History:   Diagnosis Date    Essential hypertension     Headache     Seizures ([address]      Past Surgical History:   Procedure Laterality Date    HX ROTATOR CUFF REPAIR Left     HX ROTATOR CUFF REPAIR Right      Social History     Socioeconomic History    Marital status:      Spouse name: Not on file    Number of children: Not on file    Years of education: Not on file    Highest education level: Not on file   Occupational History    Not on file   Tobacco Use    Smoking status: Former     Packs/day: 2.00     Years: 20.00     Pack years: 40.00     Types: Cigarettes     Quit date:      Years since quittin.0    Smokeless tobacco: Never   Vaping Use    Vaping Use: Never used   Substance and Sexual Activity    Alcohol use:  Yes     Alcohol/week: 2.0 standard drinks     Types: 1 Cans of beer, 1 Shots of liquor per week     Comment: occasionally    Drug use: Yes     Types: Marijuana    Sexual activity: Not on file   Other Topics Concern    Not on file   Social History Narrative    Not on file     Social Determinants of Health     Financial Resource Strain: Not on file   Food Insecurity: Not on file   Transportation Needs: Not on file   Physical Activity: Not on file   Stress: Not on file   Social Connections: Not on file   Intimate Partner Violence: Not on file   Housing Stability: Not on file     No Known Allergies    Review of Systems -   General ROS: negative for - chills, fever, night sweats, weight gain or weight loss  Psychological ROS: negative for - irritability or mood swings  Ophthalmic ROS: negative for - decreased vision or eye pain  ENT ROS: positive for + headaches; negative for hearing change or sore throat  Allergy and Immunology ROS: negative for - hives  Hematological and Lymphatic ROS: negative for - bleeding problems, bruising, fatigue or jaundice  Endocrine ROS: negative for - breast changes, malaise/lethargy or temperature intolerance; no increase in thirst or hunger  Respiratory ROS: + SOB, no cough or wheezing  Cardiovascular ROS: + chest pain and dyspnea on exertion  Gastrointestinal ROS: no abdominal pain, change in bowel habits, or black or bloody stools  Genito-Urinary ROS: + dysuria, trouble voiding; negative for - hematuria  Musculoskeletal ROS: positive for + joint pain, stiffness, and swelling to hands and feet; negative for - muscular weakness  Neurological ROS: no TIA or stroke symptoms  Dermatological ROS: negative for - mole changes or rash        Physical Examination:    Visit Vitals  BP (!) 160/94   Pulse 82   Temp 97.5 °F (36.4 °C) (Temporal)   Resp 18   Ht 5' 9\" (1.753 m)   Wt 222 lb 6.4 oz (100.9 kg)   SpO2 99%   BMI 32.84 kg/m² Gen: alert, oriented, no acute distress  Head: normocephalic, atraumatic  Ears: external auditory with moderate cerumen B/L, TMs without erythema or effusion  Eyes: pupils equal round reactive to light, sclera clear, conjunctiva clear  Nose: normal turbinates, no rhinorrhea  Oral: moist mucus membranes, no oral lesions, no pharyngeal inflammation or exudate  Neck: supple, no lymphadenopathy  Resp: no increased work of breathing, lungs clear to ausculation bilaterally  CV: S1, S2 normal, no murmurs, rubs, or gallops. No carotid bruits. No swelling to the extremities. Abd: soft, not tender, mildly distended. Normal bowel sounds. No hernias. Skin: no lesion or rash        Assessment/Plan:  Differential diagnosis and treatment options reviewed with patient who is in agreement with treatment plan as outlined below. ICD-10-CM ICD-9-CM    1. Essential hypertension  I10 401.9 AMB POC EKG ROUTINE W/ 12 LEADS, INTER & REP      METABOLIC PANEL, COMPREHENSIVE      METABOLIC PANEL, COMPREHENSIVE      SLEEP MEDICINE REFERRAL      valsartan (DIOVAN) 40 mg tablet      2. 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 9XX0 340.31 METABOLIC PANEL, COMPREHENSIVE      CBC WITH AUTOMATED DIFF      CBC WITH AUTOMATED DIFF      METABOLIC PANEL, COMPREHENSIVE      allopurinoL (ZYLOPRIM) 100 mg tablet      3. Intermittent chest pain  R07.9 786.50 AMB POC EKG ROUTINE W/ 12 LEADS, INTER & REP      METABOLIC PANEL, COMPREHENSIVE      AMYLASE      LIPASE      CBC WITH AUTOMATED DIFF      CBC WITH AUTOMATED DIFF      LIPASE      AMYLASE      METABOLIC PANEL, COMPREHENSIVE      4. Urinary frequency  R35.0 788.41 AMB POC URINALYSIS DIP STICK AUTO W/O MICRO      PSA, DIAGNOSTIC (PROSTATE SPECIFIC AG)      PSA, DIAGNOSTIC (PROSTATE SPECIFIC AG)      5.  Dysuria  R30.0 788.1 AMB POC URINALYSIS DIP STICK AUTO W/O MICRO      PSA, DIAGNOSTIC (PROSTATE SPECIFIC AG)      PSA, DIAGNOSTIC (PROSTATE SPECIFIC AG)      6. Screening, lipid  Z13.220 V77.91 LIPID PANEL      LIPID PANEL      7. Elevated glucose  R73.09 790.29 HEMOGLOBIN A1C WITH EAG      HEMOGLOBIN A1C WITH EAG      8. Screening for thyroid disorder  Z13.29 V77.0 TSH 3RD GENERATION      TSH 3RD GENERATION      9. Vitamin D deficiency  E55.9 268.9 VITAMIN D, 25 HYDROXY      VITAMIN D, 25 HYDROXY      10. Personal history of tobacco use, presenting hazards to health  Z87.891 V15.82 COUNSELING VISIT TO DISCUSS NEED FOR LUNG CANCER SCREENING      CT LOW DOSE LUNG CANCER SCREENING      11. Snoring  R06.83 786.09 SLEEP MEDICINE REFERRAL      12. Gastroesophageal reflux disease, unspecified whether esophagitis present  K21.9 530.81 omeprazole (PRILOSEC) 40 mg capsule            HTN:  Begin Valsartan  Follow up in 1 month with labs  Begin DASH diet  Drink plenty of water  Educate on daily BP checks  Encourage daily exercise    Headache:  Likely due to BP  Will reassess at 1 month follow up  Take NSAIDS or Tylenol as needed for pain. Chest Pain:  Chest CT ordered. Serum pancreatic enzymes ordered. Follow up with Cardiology. Dizziness:  Likely due to BP  Assess ECG   -normal sinus, bradycardic rhythm   -no change from previous ECG  Assess for electrolyte imbalance, anemia, or other conditions through blood work   Will reassess at 1 month follow up  Flush ears nightly to prevent wax build up. Dysuria:  Check UA, BG, and A1C results.   -Normal UA with trace blood noted. Will reassess in 1 month and refer to urology if needed      Gout:  Continue taking Allopurinol  Avoid red meats, shellfish, and alcohol. Take NSAIDs as needed for pain. Verbal and written instructions (see AVS) provided. Patient expresses understanding and agreement of diagnosis and treatment plan.

## 2023-02-02 NOTE — PROGRESS NOTES
Subjective:     Chief Complaint   Patient presents with    Follow-up     Routine check        HPI:  62 y.o.  presents for follow up appointment. HTN  Has been out of medicine since September. BP was normal in December when seen here by Dr Brandon Braxton so didn't restart BP medicine then  BP at home 150-160s/70-80s      Mid sternal/thoracic pain, sharp   Muscle relaxer did not work that Dr Brandon Braxton gave him in December  Occurs 2-4 times per week. Dizziness for while, past month or so. Most of the time occurs with bending or getting up quickly, sometimes occurs when walking. Occurring 3-4 times per week, lasts for few seconds and sometimes 2-3 hours and has to lay down for a while. Has a HA with these longer episodes. HA starts in back of neck and radiates to frontal forehead. Some blurry vision with the HA. Sometimes loses balance, had one fall from dizziness but no injury  Has not been able to work due to dizziness. Dry mouth all the time. Urinating often (5-6 times per hour), and has some painful urination as well. No blood in urine. Has some bilateral hand/feet swelling that comes and goes, mainly when raining. No hospital, ER or specialist visits since last primary care visit except as noted above. Past Medical History:   Diagnosis Date    Essential hypertension     Headache     Seizures ([address]        Social History     Tobacco Use    Smoking status: Former     Packs/day: 2.00     Years: 20.00     Pack years: 40.00     Types: Cigarettes     Quit date: 2016     Years since quittin.0    Smokeless tobacco: Never   Vaping Use    Vaping Use: Never used   Substance Use Topics    Alcohol use:  Yes     Alcohol/week: 2.0 standard drinks     Types: 1 Cans of beer, 1 Shots of liquor per week     Comment: occasionally    Drug use: Yes     Types: Marijuana       Outpatient Medications Marked as Taking for the 23 encounter (Office Visit) with Delvin Churchill NP   Medication Sig Dispense Refill aspirin delayed-release 81 mg tablet Take 1 Tablet by mouth daily. 90 Tablet 0       No Known Allergies    Health Maintenance reviewed . ROS:  Gen: + fatigue, no fever, no chills, no unexplained weight loss or weight gain  Eyes: no excessive tearing, itching, or discharge  Nose: no rhinorrhea, no sinus pain  Mouth: no oral lesions, no sore throat, no difficulty swallowing  Resp: no shortness of breath, no wheezing, no cough. Occasional MARISCAL  CV: +intermittent chest pain, no orthopnea, no paroxysmal nocturnal dyspnea, no lower extremity edema, no palpitations  Abd: no nausea, no heartburn, no diarrhea, no constipation, no abdominal pain  Neuro: +intermittent  headaches, no syncope or presyncopal episodes  Endo: no polyuria, no polydipsia. : no hematuria, +mild  dysuria, + frequency, no incontinence  Heme: no lymphadenopathy, no easy bruising or bleeding, no night sweats  MSK: + joint pain and swelling in hands and feet when raining, occasional gout flares     PE:  Visit Vitals  BP (!) 160/94   Pulse 82   Temp 97.5 °F (36.4 °C) (Temporal)   Resp 18   Ht 5' 9\" (1.753 m)   Wt 222 lb 6.4 oz (100.9 kg)   SpO2 99%   BMI 32.84 kg/m²     Gen: alert, oriented, no acute distress  Head: normocephalic, atraumatic  Ears: external auditory canals with cerumen, right canal is almost occluded with wax. Eyes: pupils equal round reactive to light, sclera clear, conjunctiva clear  Oral: moist mucus membranes, no oral lesions, no pharyngeal inflammation or exudate  Neck: symmetric normal sized thyroid, no carotid bruits, no jugular vein distention  Resp: no increase work of breathing, lungs clear to ausculation bilaterally, no wheezing, rales or rhonchi  CV: S1, S2 normal.  No murmurs, rubs, or gallops. Abd: soft, not tender, not distended. No hepatosplenomegaly. Normal bowel sounds. No hernias. No abdominal or renal bruits.   Neuro: cranial nerves intact, normal strength and movement in all extremities, and sensation intact and symmetric. Skin: no lesion or rash  Extremities: no cyanosis or edema    Results for orders placed or performed in visit on 02/02/23   AMB POC URINALYSIS DIP STICK AUTO W/O MICRO   Result Value Ref Range    Color (UA POC) Yellow     Clarity (UA POC) Clear     Glucose (UA POC) Negative Negative    Bilirubin (UA POC) Negative Negative    Ketones (UA POC) Negative Negative    Specific gravity (UA POC) 1.025 1.001 - 1.035    Blood (UA POC) 1+ Negative    pH (UA POC) 6.0 4.6 - 8.0    Protein (UA POC) Negative Negative    Urobilinogen (UA POC) 0.2 mg/dL 0.2 - 1    Nitrites (UA POC) Negative Negative    Leukocyte esterase (UA POC) Negative Negative       Assessment/Plan:  Differential diagnosis and treatment options reviewed with patient who is in agreement with treatment plan as outlined below. ICD-10-CM ICD-9-CM    1. Essential hypertension  I10 401.9 AMB POC EKG ROUTINE W/ 12 LEADS, INTER & REP      METABOLIC PANEL, COMPREHENSIVE      METABOLIC PANEL, COMPREHENSIVE      SLEEP MEDICINE REFERRAL      valsartan (DIOVAN) 40 mg tablet      2. 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 9XX0 727.42 METABOLIC PANEL, COMPREHENSIVE      CBC WITH AUTOMATED DIFF      CBC WITH AUTOMATED DIFF      METABOLIC PANEL, COMPREHENSIVE      allopurinoL (ZYLOPRIM) 100 mg tablet      3. Intermittent chest pain  R07.9 786.50 AMB POC EKG ROUTINE W/ 12 LEADS, INTER & REP      METABOLIC PANEL, COMPREHENSIVE      AMYLASE      LIPASE      CBC WITH AUTOMATED DIFF      CBC WITH AUTOMATED DIFF      LIPASE      AMYLASE      METABOLIC PANEL, COMPREHENSIVE      4. Urinary frequency  R35.0 788.41 AMB POC URINALYSIS DIP STICK AUTO W/O MICRO      PSA, DIAGNOSTIC (PROSTATE SPECIFIC AG)      PSA, DIAGNOSTIC (PROSTATE SPECIFIC AG)      5.  Dysuria  R30.0 788.1 AMB POC URINALYSIS DIP STICK AUTO W/O MICRO      PSA, DIAGNOSTIC (PROSTATE SPECIFIC AG)      PSA, DIAGNOSTIC (PROSTATE SPECIFIC AG)      6. Screening, lipid  Z13.220 V77.91 LIPID PANEL      LIPID PANEL      7. Elevated glucose  R73.09 790.29 HEMOGLOBIN A1C WITH EAG      HEMOGLOBIN A1C WITH EAG      8. Screening for thyroid disorder  Z13.29 V77.0 TSH 3RD GENERATION      TSH 3RD GENERATION      9. Vitamin D deficiency  E55.9 268.9 VITAMIN D, 25 HYDROXY      VITAMIN D, 25 HYDROXY      10. Personal history of tobacco use, presenting hazards to health  Z87.891 V15.82 COUNSELING VISIT TO DISCUSS NEED FOR LUNG CANCER SCREENING      CT LOW DOSE LUNG CANCER SCREENING      11. Snoring  R06.83 786.09 SLEEP MEDICINE REFERRAL      12. Gastroesophageal reflux disease, unspecified whether esophagitis present  K21.9 530.81 omeprazole (PRILOSEC) 40 mg capsule        EKG-NSB, no change when compared to old EKG  CT lung CA screening ordered   ? GERD induced symptoms. Take PPI daily, increase dose. HTN-restart valsartan. DASH diet  Restart gout medicine. Increase water. +snoring:  +daytime sleepiness and insomnia. Refer to sleep medicine     Agree with PA student note below. We saw this patient together and developed plan. Discussed BMI and healthy weight. Encouraged patient to work to implement changes including diet high in raw fruits and vegetables, lean protein and good fats. Limit refined, processed carbohydrates and sugar. Encouraged regular exercise. Recommended regular cardiovascular exercise 3-6 times per week for 30-60 minutes daily. I have discussed the diagnosis with the patient and the intended plan as seen in the above orders. The patient has received an after-visit summary and questions were answered concerning future plans. I have discussed medication side effects and warnings with the patient as well. The patient verbalizes understanding and agreement with the plan. Discussed with the patient the current USPSTF guidelines released March 9, 2021 for screening for lung cancer.     For adults aged 48 to [de-identified] years who have a 20 pack-year smoking history and currently smoke or have quit within the past 15 years the grade B recommendation is to:  Screen for lung cancer with low-dose computed tomography (LDCT) every year. Stop screening once a person has not smoked for 15 years or has a health problem that limits life expectancy or the ability to have lung surgery. The patient  reports that he quit smoking about 7 years ago. His smoking use included cigarettes. He has a 40.00 pack-year smoking history. He has never used smokeless tobacco..  Discussed with patient the risks and benefits of screening, including over-diagnosis, false positive rate, and total radiation exposure. The patient currently exhibits no signs or symptoms suggestive of lung cancer. Discussed with patient the importance of compliance with yearly annual lung cancer screenings and willingness to undergo diagnosis and treatment if screening scan is positive. In addition, the patient was counseled regarding the importance of remaining smoke free and/or total smoking cessation.     Also reviewed the following if the patient has Medicare that as of February 10, 2022, Medicare only covers LDCT screening in patients aged 51-72 with at least a 20 pack-year smoking history who currently smoke or have quit in the last 15 years

## 2023-02-03 DIAGNOSIS — E55.9 VITAMIN D DEFICIENCY: ICD-10-CM

## 2023-02-03 DIAGNOSIS — R10.10 UPPER ABDOMINAL PAIN: Primary | ICD-10-CM

## 2023-02-03 LAB
25(OH)D3 SERPL-MCNC: 15.9 NG/ML (ref 30–100)
ALBUMIN SERPL-MCNC: 4.1 G/DL (ref 3.5–5)
ALBUMIN/GLOB SERPL: 1.2 (ref 1.1–2.2)
ALP SERPL-CCNC: 82 U/L (ref 45–117)
ALT SERPL-CCNC: 20 U/L (ref 12–78)
AMYLASE SERPL-CCNC: 137 U/L (ref 25–115)
ANION GAP SERPL CALC-SCNC: 5 MMOL/L (ref 5–15)
AST SERPL-CCNC: 16 U/L (ref 15–37)
BASOPHILS # BLD: 0.1 K/UL (ref 0–0.1)
BASOPHILS NFR BLD: 1 % (ref 0–1)
BILIRUB SERPL-MCNC: 0.3 MG/DL (ref 0.2–1)
BUN SERPL-MCNC: 11 MG/DL (ref 6–20)
BUN/CREAT SERPL: 9 (ref 12–20)
CALCIUM SERPL-MCNC: 9.2 MG/DL (ref 8.5–10.1)
CHLORIDE SERPL-SCNC: 107 MMOL/L (ref 97–108)
CHOLEST SERPL-MCNC: 178 MG/DL
CO2 SERPL-SCNC: 31 MMOL/L (ref 21–32)
CREAT SERPL-MCNC: 1.19 MG/DL (ref 0.7–1.3)
DIFFERENTIAL METHOD BLD: NORMAL
EOSINOPHIL # BLD: 0.2 K/UL (ref 0–0.4)
EOSINOPHIL NFR BLD: 3 % (ref 0–7)
ERYTHROCYTE [DISTWIDTH] IN BLOOD BY AUTOMATED COUNT: 14.2 % (ref 11.5–14.5)
EST. AVERAGE GLUCOSE BLD GHB EST-MCNC: 117 MG/DL
GLOBULIN SER CALC-MCNC: 3.5 G/DL (ref 2–4)
GLUCOSE SERPL-MCNC: 91 MG/DL (ref 65–100)
HBA1C MFR BLD: 5.7 % (ref 4–5.6)
HCT VFR BLD AUTO: 42.1 % (ref 36.6–50.3)
HDLC SERPL-MCNC: 45 MG/DL
HDLC SERPL: 4 (ref 0–5)
HGB BLD-MCNC: 14 G/DL (ref 12.1–17)
IMM GRANULOCYTES # BLD AUTO: 0 K/UL (ref 0–0.04)
IMM GRANULOCYTES NFR BLD AUTO: 0 % (ref 0–0.5)
LDLC SERPL CALC-MCNC: 90.4 MG/DL (ref 0–100)
LIPASE SERPL-CCNC: 648 U/L (ref 73–393)
LYMPHOCYTES # BLD: 3.5 K/UL (ref 0.8–3.5)
LYMPHOCYTES NFR BLD: 43 % (ref 12–49)
MCH RBC QN AUTO: 30.8 PG (ref 26–34)
MCHC RBC AUTO-ENTMCNC: 33.3 G/DL (ref 30–36.5)
MCV RBC AUTO: 92.7 FL (ref 80–99)
MONOCYTES # BLD: 0.6 K/UL (ref 0–1)
MONOCYTES NFR BLD: 7 % (ref 5–13)
NEUTS SEG # BLD: 3.6 K/UL (ref 1.8–8)
NEUTS SEG NFR BLD: 46 % (ref 32–75)
NRBC # BLD: 0 K/UL (ref 0–0.01)
NRBC BLD-RTO: 0 PER 100 WBC
PLATELET # BLD AUTO: 209 K/UL (ref 150–400)
PMV BLD AUTO: 12.6 FL (ref 8.9–12.9)
POTASSIUM SERPL-SCNC: 4 MMOL/L (ref 3.5–5.1)
PROT SERPL-MCNC: 7.6 G/DL (ref 6.4–8.2)
PSA SERPL-MCNC: 2.6 NG/ML (ref 0.01–4)
RBC # BLD AUTO: 4.54 M/UL (ref 4.1–5.7)
SODIUM SERPL-SCNC: 143 MMOL/L (ref 136–145)
TRIGL SERPL-MCNC: 213 MG/DL (ref ?–150)
TSH SERPL DL<=0.05 MIU/L-ACNC: 1.09 UIU/ML (ref 0.36–3.74)
VLDLC SERPL CALC-MCNC: 42.6 MG/DL
WBC # BLD AUTO: 8 K/UL (ref 4.1–11.1)

## 2023-02-03 RX ORDER — ERGOCALCIFEROL 1.25 MG/1
50000 CAPSULE ORAL
Qty: 12 CAPSULE | Refills: 3 | Status: SHIPPED | OUTPATIENT
Start: 2023-02-03

## 2023-02-04 NOTE — PROGRESS NOTES
Pancreatic enzymes are elevated  Ordering abdominal US to assess. Vitamin d is pretty low. Will send once weekly high dose prescription vitamin d in. Will be in touch after imaging results  Avoid any alcohol use. Avoid any fatty or fried foods.

## 2023-02-06 ENCOUNTER — TELEPHONE (OUTPATIENT)
Dept: SLEEP MEDICINE | Age: 58
End: 2023-02-06

## 2023-02-06 NOTE — TELEPHONE ENCOUNTER
Phoned the patient to schedule a sleep consult per John Carson NP. VM full, unable to leave a message.

## 2023-02-10 ENCOUNTER — VIRTUAL VISIT (OUTPATIENT)
Dept: FAMILY MEDICINE CLINIC | Age: 58
End: 2023-02-10
Payer: COMMERCIAL

## 2023-02-10 DIAGNOSIS — R45.0 JITTERY FEELING: Primary | ICD-10-CM

## 2023-02-10 NOTE — PROGRESS NOTES
Chief Complaint   Patient presents with    Medication Problem     Medication makes him \"feel terrible\". 1. Have you been to the ER, urgent care clinic since your last visit? Hospitalized since your last visit? No    2. Have you seen or consulted any other health care providers outside of the [address] [place] [address] since your last visit? Include any pap smears or colon screening.  No    Health Maintenance Due   Topic Date Due    DTaP/Tdap/Td series (1 - Tdap) Never done    Shingles Vaccine (1 of 2) Never done    Low dose CT lung screening  Never done

## 2023-02-10 NOTE — PROGRESS NOTES
Eugenia Smalls is a 62 y.o. male, evaluated via audio-only technology on 2/10/2023 for Medication Problem (Medication makes him \"feel terrible\". )      Assessment & Plan:   Diagnoses and all orders for this visit:    1. Jittery feeling    I have advised that he try to break up his medication doses, maybe take the omeprazole and allopurinol in the morning and take his valsartan in the evening. Encouraged adequate hydration and nutritious diet. Would consider taking allopurinol just once per day for now and then increasing to twice per day next week if he is feeling okay to avoid adding too many medications at once. He is instructed to go to the emergency room should he have worsening of symptoms over the weekend. He is also going to call today and make his imaging appointment so that we can have a better look at his chest and abdominal area. I encouraged him to avoid any spicy or fatty foods. Subjective:     Patient states that he is feeling better now. Notes earlier when he called to make this appointment he was feeling lack of energy and also feeling a little jittery and intermittently feeling cold, no chills and no fever. Had a throbbing/pulsating headache then laid down for a while and felt better. He is eating and drinking fine. He has no nausea vomiting or diarrhea. He was unaware if the medications are working feel like this although we reviewed that none of these medications are little, he has been on all of these medications in the past.  He had just been off of all of his medicines for a little bit. He thought maybe the valsartan caused his blood pressure to drop too low which is why he was feeling like he was feeling better when he checked his BP it was normal reading 143/74 and also notes his HR normal in the 70s. He does note that he took all the medications at once this morning. He thinks that he is drinking enough water, denies drinking any alcohol.   He has not scheduled his imaging yet, I have encouraged that he call today and make those appointments. He is due for a CT of the chest as well as an ultrasound of his gallbladder/pancreas area. He denies any shortness of breath or chest pains or palpitations. The pain that he has been having on and off in his back and sometimes in his chest on the right and or on the left is present but no increase in frequency and the pain has not been worsening. He denies any blurred vision or numbness or paralysis or any other stroke symptoms. At this time he says he is feeling okay, was just concerned that he was feeling a little jittery earlier. Prior to Admission medications    Medication Sig Start Date End Date Taking? Authorizing Provider   ergocalciferol (ERGOCALCIFEROL) 1,250 mcg (50,000 unit) capsule Take 1 Capsule by mouth every seven (7) days. Indications: vitamin D deficiency (high dose therapy) 2/3/23  Yes Gabby Russell NP   allopurinoL (ZYLOPRIM) 100 mg tablet Take 1 Tablet by mouth two (2) times a day. 2/2/23  Yes Gabby Russell NP   valsartan (DIOVAN) 40 mg tablet Take 1 Tablet by mouth daily. 2/2/23  Yes Gabby Russell NP   omeprazole (PRILOSEC) 40 mg capsule Take 1 Capsule by mouth daily. 2/2/23  Yes Gabby Russell NP   aspirin delayed-release 81 mg tablet Take 1 Tablet by mouth daily.  2/7/22  Yes Gabby Russell NP         ROS  Gen: + fatigue, -fever,- chills  Eyes: no excessive tearing, itching, or discharge  Nose: no rhinorrhea, no sinus pain  Mouth: no oral lesions, no sore throat  Resp: no shortness of breath, no wheezing, no cough  CV: no chest pain, no paroxysmal nocturnal dyspnea  Abd: no nausea, no heartburn, no diarrhea, no constipation, no abdominal pain  Neuro: intermittent headaches, no syncope or presyncopal episodes  Endo: no polyuria, no polydipsia  Heme: no lymphadenopathy, no easy bruising or bleeding    No data recorded     Maya Plater was evaluated through a patient-initiated, synchronous (real-time) audio only encounter. He (or guardian if applicable) is aware that it is a billable service, which includes applicable co-pays, with coverage as determined by his insurance carrier. This visit was conducted with the patient's (and/or Fleet [place] guardian's) verbal consent. He has not had a related appointment within my department in the past 7 days or scheduled within the next 24 hours. The patient was located in a state where the provider was licensed to provide care. The patient was located at: Home: [address] [place] [address]  [place] [address]  The provider was located at:  Facility (Appt Department): [facility] [address] [place] [address]    Total Time: minutes: 21-30 minutes    Rj Stephens NP

## 2023-02-13 ENCOUNTER — TELEPHONE (OUTPATIENT)
Dept: FAMILY MEDICINE CLINIC | Age: 58
End: 2023-02-13

## 2023-02-13 NOTE — TELEPHONE ENCOUNTER
Can you ask patient if that was the earliest the [address] could be done with scheduling or the soonest he was able to go have it done?   If scheduling is saying that far out then I may have to change to order to stat

## 2023-02-13 NOTE — TELEPHONE ENCOUNTER
Pt is calling wanting you to know that his U S is scheduled for 2/24/23 and the CT is scheduled for 3/5/23 he is going to call and trying to schedule the apt for the sleep apnea

## 2023-02-14 NOTE — TELEPHONE ENCOUNTER
verified. Patient states the appt he has for ultrasound is the earliest the scheduling team could get him scheduled.

## 2023-02-14 NOTE — TELEPHONE ENCOUNTER
Spoke with scheduling. They stated they had a couple openings for this Friday 2/17 for ultrasound. Tried contacting patient to see if I could change ultrasound appointment to the earlier date but was unable to reach patient or leave voicemail to discuss ultrasound (full). Also unable to reach patients wife either (disconnected).

## 2023-02-15 ENCOUNTER — TELEPHONE (OUTPATIENT)
Dept: FAMILY MEDICINE CLINIC | Age: 58
End: 2023-02-15

## 2023-02-15 NOTE — TELEPHONE ENCOUNTER
verified. Made patient an appt for [address] on 23 at 8:00am with a 7:30am arrival time at [facility] outpatient building. Also informed pt to fast before, nothing to eat/drink after 12:00am on  night. Was unable to get an earlier appt for CT. Informed pt of appt and information. Pt verified understanding and will go to appt on .

## 2023-02-17 ENCOUNTER — HOSPITAL ENCOUNTER (OUTPATIENT)
Dept: ULTRASOUND IMAGING | Age: 58
Discharge: HOME | End: 2023-02-17
Attending: NURSE PRACTITIONER
Payer: COMMERCIAL

## 2023-02-17 DIAGNOSIS — R10.10 UPPER ABDOMINAL PAIN: ICD-10-CM

## 2023-02-17 PROCEDURE — 76705 ECHO EXAM OF ABDOMEN: CPT

## 2023-02-20 NOTE — PROGRESS NOTES
verified. Informed pt of message from provider regarding ultrasound results. Pt verified understanding. Pt states he does not have the pain as much anymore that is has gotten better. Pt states before he was having the pain 3-4 times a week but since last visit he has only had the pain about 1 or 2 times since.

## 2023-03-05 ENCOUNTER — HOSPITAL ENCOUNTER (OUTPATIENT)
Dept: CT IMAGING | Age: 58
Discharge: HOME | End: 2023-03-05
Attending: NURSE PRACTITIONER
Payer: COMMERCIAL

## 2023-03-05 DIAGNOSIS — Z87.891 PERSONAL HISTORY OF TOBACCO USE, PRESENTING HAZARDS TO HEALTH: ICD-10-CM

## 2023-03-05 PROCEDURE — 71271 CT THORAX LUNG CANCER SCR C-: CPT

## 2023-10-27 ENCOUNTER — HOSPITAL ENCOUNTER (EMERGENCY)
Dept: HOSPITAL 15 - ER | Age: 58
Discharge: HOME | End: 2023-10-27
Payer: SELF-PAY

## 2023-10-27 VITALS
TEMPERATURE: 97.6 F | HEART RATE: 74 BPM | RESPIRATION RATE: 16 BRPM | SYSTOLIC BLOOD PRESSURE: 158 MMHG | OXYGEN SATURATION: 100 % | DIASTOLIC BLOOD PRESSURE: 79 MMHG

## 2023-10-27 VITALS — BODY MASS INDEX: 30.37 KG/M2 | WEIGHT: 200.4 LBS | HEIGHT: 68 IN

## 2023-10-27 DIAGNOSIS — F17.210: ICD-10-CM

## 2023-10-27 DIAGNOSIS — T43.651A: Primary | ICD-10-CM

## 2023-10-27 DIAGNOSIS — F15.90: ICD-10-CM
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
## 2023-10-27 LAB
ALBUMIN SERPL-MCNC: 4.5 G/DL (ref 3.2–4.8)
ALP SERPL-CCNC: 76 U/L (ref 46–116)
ALT SERPL-CCNC: 19 U/L (ref 7–40)
ANION GAP SERPL CALCULATED.3IONS-SCNC: 5 MMOL/L (ref 5–15)
APAP SERPL-MCNC: < 2 UG/ML (ref 10–20)
BILIRUB SERPL-MCNC: 0.4 MG/DL (ref 0.2–1)
BUN SERPL-MCNC: 12 MG/DL (ref 9–23)
BUN/CREAT SERPL: 10.2 (ref 10–20)
CALCIUM SERPL-MCNC: 9.1 MG/DL (ref 8.7–10.4)
CHLORIDE SERPL-SCNC: 107 MMOL/L (ref 98–107)
CO2 SERPL-SCNC: 27 MMOL/L (ref 20–30)
GLUCOSE SERPL-MCNC: 138 MG/DL (ref 74–106)
HCT VFR BLD AUTO: 44 % (ref 41–53)
HGB BLD-MCNC: 14.3 G/DL (ref 13.5–17.5)
MCH RBC QN AUTO: 27.8 PG (ref 28–32)
MCV RBC AUTO: 85.4 FL (ref 80–100)
NRBC BLD QL AUTO: 0.1 %
POTASSIUM SERPL-SCNC: 4.1 MMOL/L (ref 3.5–5.1)
PROT SERPL-MCNC: 7.7 G/DL (ref 5.7–8.2)
SALICYLATES SERPL-MCNC: < 3 MG/DL (ref 2.8–20)
SODIUM SERPL-SCNC: 139 MMOL/L (ref 136–145)

## 2023-10-27 PROCEDURE — 96374 THER/PROPH/DIAG INJ IV PUSH: CPT

## 2023-10-27 PROCEDURE — 85025 COMPLETE CBC W/AUTO DIFF WBC: CPT

## 2023-10-27 PROCEDURE — 70450 CT HEAD/BRAIN W/O DYE: CPT

## 2023-10-27 PROCEDURE — 36415 COLL VENOUS BLD VENIPUNCTURE: CPT

## 2023-10-27 PROCEDURE — 80329 ANALGESICS NON-OPIOID 1 OR 2: CPT

## 2023-10-27 PROCEDURE — 80320 DRUG SCREEN QUANTALCOHOLS: CPT

## 2023-10-27 PROCEDURE — 80053 COMPREHEN METABOLIC PANEL: CPT

## 2023-10-27 PROCEDURE — 93005 ELECTROCARDIOGRAM TRACING: CPT

## 2023-10-27 PROCEDURE — 99285 EMERGENCY DEPT VISIT HI MDM: CPT
